# Patient Record
Sex: FEMALE | Race: WHITE | NOT HISPANIC OR LATINO | Employment: OTHER | URBAN - METROPOLITAN AREA
[De-identification: names, ages, dates, MRNs, and addresses within clinical notes are randomized per-mention and may not be internally consistent; named-entity substitution may affect disease eponyms.]

---

## 2021-01-14 ENCOUNTER — TELEPHONE (OUTPATIENT)
Dept: OBSTETRICS AND GYNECOLOGY | Facility: CLINIC | Age: 51
End: 2021-01-14

## 2021-02-03 ENCOUNTER — OFFICE VISIT (OUTPATIENT)
Dept: OBSTETRICS AND GYNECOLOGY | Facility: CLINIC | Age: 51
End: 2021-02-03
Payer: COMMERCIAL

## 2021-02-03 VITALS
HEIGHT: 61 IN | SYSTOLIC BLOOD PRESSURE: 115 MMHG | BODY MASS INDEX: 34.55 KG/M2 | DIASTOLIC BLOOD PRESSURE: 75 MMHG | WEIGHT: 183 LBS | HEART RATE: 80 BPM

## 2021-02-03 DIAGNOSIS — Z12.31 BREAST CANCER SCREENING BY MAMMOGRAM: ICD-10-CM

## 2021-02-03 DIAGNOSIS — Z01.419 WELL WOMAN EXAM WITH ROUTINE GYNECOLOGICAL EXAM: Primary | ICD-10-CM

## 2021-02-03 PROCEDURE — 3008F PR BODY MASS INDEX (BMI) DOCUMENTED: ICD-10-PCS | Mod: CPTII,S$GLB,, | Performed by: OBSTETRICS & GYNECOLOGY

## 2021-02-03 PROCEDURE — 99396 PR PREVENTIVE VISIT,EST,40-64: ICD-10-PCS | Mod: 25,S$GLB,, | Performed by: OBSTETRICS & GYNECOLOGY

## 2021-02-03 PROCEDURE — 99396 PREV VISIT EST AGE 40-64: CPT | Mod: 25,S$GLB,, | Performed by: OBSTETRICS & GYNECOLOGY

## 2021-02-03 PROCEDURE — 3008F BODY MASS INDEX DOCD: CPT | Mod: CPTII,S$GLB,, | Performed by: OBSTETRICS & GYNECOLOGY

## 2021-02-03 PROCEDURE — 82274 ASSAY TEST FOR BLOOD FECAL: CPT | Mod: QW,S$GLB,, | Performed by: OBSTETRICS & GYNECOLOGY

## 2021-02-03 PROCEDURE — 82274 PR  BLOOD,OCCULT,FECAL HGB,FECES,1-3 SIMULT: ICD-10-PCS | Mod: QW,S$GLB,, | Performed by: OBSTETRICS & GYNECOLOGY

## 2021-02-03 RX ORDER — FLUOXETINE HYDROCHLORIDE 20 MG/1
20 CAPSULE ORAL DAILY
Qty: 90 CAPSULE | Refills: 3 | Status: SHIPPED | OUTPATIENT
Start: 2021-02-03 | End: 2021-04-01 | Stop reason: SDUPTHER

## 2021-02-03 RX ORDER — ESTRADIOL 0.5 MG/.5G
0.05 GEL TOPICAL DAILY
Qty: 90 PACKET | Refills: 3 | Status: SHIPPED | OUTPATIENT
Start: 2021-02-03 | End: 2022-02-07

## 2021-02-03 RX ORDER — ALPRAZOLAM 0.5 MG/1
0.5 TABLET ORAL 2 TIMES DAILY PRN
Qty: 28 TABLET | Refills: 3 | Status: SHIPPED | OUTPATIENT
Start: 2021-02-03 | End: 2023-02-13 | Stop reason: SDUPTHER

## 2021-02-03 RX ORDER — MOMETASONE FUROATE AND FORMOTEROL FUMARATE DIHYDRATE 100; 5 UG/1; UG/1
AEROSOL RESPIRATORY (INHALATION)
COMMUNITY
Start: 2021-02-01

## 2021-02-03 RX ORDER — TRIAMCINOLONE ACETONIDE 0.25 MG/G
CREAM TOPICAL
COMMUNITY
Start: 2021-01-15 | End: 2022-02-07

## 2021-02-03 RX ORDER — ALPRAZOLAM 0.5 MG/1
0.5 TABLET ORAL
COMMUNITY
End: 2021-02-03 | Stop reason: SDUPTHER

## 2021-02-03 RX ORDER — ALBUTEROL SULFATE 90 UG/1
AEROSOL, METERED RESPIRATORY (INHALATION)
COMMUNITY
Start: 2021-01-27

## 2021-02-03 RX ORDER — BUPROPION HYDROCHLORIDE 150 MG/1
150 TABLET ORAL EVERY MORNING
Qty: 90 TABLET | Refills: 2 | Status: SHIPPED | OUTPATIENT
Start: 2021-02-03 | End: 2022-02-07

## 2021-02-03 RX ORDER — FLUOXETINE HYDROCHLORIDE 20 MG/1
20 CAPSULE ORAL DAILY
COMMUNITY
Start: 2021-01-19 | End: 2021-02-03 | Stop reason: SDUPTHER

## 2021-02-03 RX ORDER — ESTRADIOL 0.5 MG/.5G
GEL TOPICAL
COMMUNITY
Start: 2021-01-06 | End: 2021-02-03 | Stop reason: SDUPTHER

## 2021-02-09 ENCOUNTER — PATIENT MESSAGE (OUTPATIENT)
Dept: OBSTETRICS AND GYNECOLOGY | Facility: CLINIC | Age: 51
End: 2021-02-09

## 2021-02-10 ENCOUNTER — PATIENT MESSAGE (OUTPATIENT)
Dept: OBSTETRICS AND GYNECOLOGY | Facility: CLINIC | Age: 51
End: 2021-02-10

## 2021-02-10 DIAGNOSIS — F41.9 ANXIETY: ICD-10-CM

## 2021-02-10 DIAGNOSIS — Z79.890 HORMONE REPLACEMENT THERAPY (HRT): Primary | ICD-10-CM

## 2021-02-10 RX ORDER — ESTRADIOL 1 MG/1
1 TABLET ORAL DAILY
Qty: 90 TABLET | Refills: 3 | Status: SHIPPED | OUTPATIENT
Start: 2021-02-10 | End: 2022-02-07

## 2021-02-10 RX ORDER — FLUOXETINE HYDROCHLORIDE 40 MG/1
40 CAPSULE ORAL DAILY
Qty: 90 CAPSULE | Refills: 3 | Status: SHIPPED | OUTPATIENT
Start: 2021-02-10 | End: 2022-02-07

## 2021-03-17 ENCOUNTER — TELEPHONE (OUTPATIENT)
Dept: OBSTETRICS AND GYNECOLOGY | Facility: CLINIC | Age: 51
End: 2021-03-17

## 2021-03-18 ENCOUNTER — PATIENT MESSAGE (OUTPATIENT)
Dept: OBSTETRICS AND GYNECOLOGY | Facility: CLINIC | Age: 51
End: 2021-03-18

## 2021-03-18 ENCOUNTER — TELEPHONE (OUTPATIENT)
Dept: OBSTETRICS AND GYNECOLOGY | Facility: CLINIC | Age: 51
End: 2021-03-18

## 2021-03-30 ENCOUNTER — PATIENT MESSAGE (OUTPATIENT)
Dept: OBSTETRICS AND GYNECOLOGY | Facility: CLINIC | Age: 51
End: 2021-03-30

## 2021-03-31 ENCOUNTER — PATIENT MESSAGE (OUTPATIENT)
Dept: OBSTETRICS AND GYNECOLOGY | Facility: CLINIC | Age: 51
End: 2021-03-31

## 2021-04-01 DIAGNOSIS — F41.9 ANXIETY: Primary | ICD-10-CM

## 2021-04-01 DIAGNOSIS — R51.9 FREQUENT HEADACHES: ICD-10-CM

## 2021-04-01 RX ORDER — BUTALBITAL, ACETAMINOPHEN AND CAFFEINE 50; 325; 40 MG/1; MG/1; MG/1
1 TABLET ORAL EVERY 6 HOURS PRN
Qty: 18 TABLET | Refills: 1 | Status: SHIPPED | OUTPATIENT
Start: 2021-04-01 | End: 2021-04-19

## 2021-04-01 RX ORDER — FLUOXETINE HYDROCHLORIDE 20 MG/1
20 CAPSULE ORAL DAILY
Qty: 90 CAPSULE | Refills: 3 | Status: SHIPPED | OUTPATIENT
Start: 2021-04-01 | End: 2022-02-07

## 2021-06-02 ENCOUNTER — TELEPHONE (OUTPATIENT)
Dept: OBSTETRICS AND GYNECOLOGY | Facility: CLINIC | Age: 51
End: 2021-06-02

## 2021-06-03 ENCOUNTER — PATIENT MESSAGE (OUTPATIENT)
Dept: OBSTETRICS AND GYNECOLOGY | Facility: CLINIC | Age: 51
End: 2021-06-03

## 2021-06-04 RX ORDER — FLUOXETINE 10 MG/1
10 CAPSULE ORAL DAILY
Qty: 30 CAPSULE | Refills: 2 | Status: SHIPPED | OUTPATIENT
Start: 2021-06-04 | End: 2021-09-03 | Stop reason: SDUPTHER

## 2021-09-03 DIAGNOSIS — F41.9 ANXIETY: Primary | ICD-10-CM

## 2021-09-03 RX ORDER — FLUOXETINE 10 MG/1
10 CAPSULE ORAL DAILY
Qty: 30 CAPSULE | Refills: 2 | Status: SHIPPED | OUTPATIENT
Start: 2021-09-03 | End: 2021-11-30 | Stop reason: SDUPTHER

## 2021-11-30 DIAGNOSIS — F41.9 ANXIETY: ICD-10-CM

## 2021-11-30 RX ORDER — ALPRAZOLAM 0.5 MG/1
0.5 TABLET ORAL NIGHTLY PRN
Qty: 28 TABLET | Refills: 0 | Status: SHIPPED | OUTPATIENT
Start: 2021-11-30 | End: 2022-02-07

## 2021-11-30 RX ORDER — FLUOXETINE 10 MG/1
10 CAPSULE ORAL DAILY
Qty: 30 CAPSULE | Refills: 2 | Status: SHIPPED | OUTPATIENT
Start: 2021-11-30 | End: 2022-02-07 | Stop reason: SDUPTHER

## 2022-02-07 ENCOUNTER — OFFICE VISIT (OUTPATIENT)
Dept: OBSTETRICS AND GYNECOLOGY | Facility: CLINIC | Age: 52
End: 2022-02-07
Payer: COMMERCIAL

## 2022-02-07 VITALS
SYSTOLIC BLOOD PRESSURE: 109 MMHG | WEIGHT: 177 LBS | DIASTOLIC BLOOD PRESSURE: 76 MMHG | BODY MASS INDEX: 33.44 KG/M2 | HEART RATE: 68 BPM

## 2022-02-07 DIAGNOSIS — Z01.419 WELL WOMAN EXAM WITH ROUTINE GYNECOLOGICAL EXAM: Primary | ICD-10-CM

## 2022-02-07 DIAGNOSIS — Z12.31 BREAST CANCER SCREENING BY MAMMOGRAM: ICD-10-CM

## 2022-02-07 DIAGNOSIS — F41.9 ANXIETY: ICD-10-CM

## 2022-02-07 DIAGNOSIS — Z12.11 COLON CANCER SCREENING: ICD-10-CM

## 2022-02-07 PROCEDURE — 82274 ASSAY TEST FOR BLOOD FECAL: CPT | Mod: QW,S$GLB,, | Performed by: OBSTETRICS & GYNECOLOGY

## 2022-02-07 PROCEDURE — 3008F BODY MASS INDEX DOCD: CPT | Mod: CPTII,S$GLB,, | Performed by: OBSTETRICS & GYNECOLOGY

## 2022-02-07 PROCEDURE — 99396 PREV VISIT EST AGE 40-64: CPT | Mod: 25,S$GLB,, | Performed by: OBSTETRICS & GYNECOLOGY

## 2022-02-07 PROCEDURE — 3074F PR MOST RECENT SYSTOLIC BLOOD PRESSURE < 130 MM HG: ICD-10-PCS | Mod: CPTII,S$GLB,, | Performed by: OBSTETRICS & GYNECOLOGY

## 2022-02-07 PROCEDURE — 3074F SYST BP LT 130 MM HG: CPT | Mod: CPTII,S$GLB,, | Performed by: OBSTETRICS & GYNECOLOGY

## 2022-02-07 PROCEDURE — 3008F PR BODY MASS INDEX (BMI) DOCUMENTED: ICD-10-PCS | Mod: CPTII,S$GLB,, | Performed by: OBSTETRICS & GYNECOLOGY

## 2022-02-07 PROCEDURE — 1159F PR MEDICATION LIST DOCUMENTED IN MEDICAL RECORD: ICD-10-PCS | Mod: CPTII,S$GLB,, | Performed by: OBSTETRICS & GYNECOLOGY

## 2022-02-07 PROCEDURE — 99396 PR PREVENTIVE VISIT,EST,40-64: ICD-10-PCS | Mod: 25,S$GLB,, | Performed by: OBSTETRICS & GYNECOLOGY

## 2022-02-07 PROCEDURE — 3078F DIAST BP <80 MM HG: CPT | Mod: CPTII,S$GLB,, | Performed by: OBSTETRICS & GYNECOLOGY

## 2022-02-07 PROCEDURE — 3078F PR MOST RECENT DIASTOLIC BLOOD PRESSURE < 80 MM HG: ICD-10-PCS | Mod: CPTII,S$GLB,, | Performed by: OBSTETRICS & GYNECOLOGY

## 2022-02-07 PROCEDURE — 82274 PR  BLOOD,OCCULT,FECAL HGB,FECES,1-3 SIMULT: ICD-10-PCS | Mod: QW,S$GLB,, | Performed by: OBSTETRICS & GYNECOLOGY

## 2022-02-07 PROCEDURE — 1159F MED LIST DOCD IN RCRD: CPT | Mod: CPTII,S$GLB,, | Performed by: OBSTETRICS & GYNECOLOGY

## 2022-02-07 RX ORDER — FLUOXETINE 10 MG/1
10 CAPSULE ORAL DAILY
Qty: 90 CAPSULE | Refills: 4 | Status: SHIPPED | OUTPATIENT
Start: 2022-02-07 | End: 2023-02-07

## 2022-02-07 NOTE — PROGRESS NOTES
Subjective:       Patient ID: Stacia Womack is a 51 y.o. female.    Chief Complaint:  Well Woman      History of Present Illness  She is doing well.  No new health issues,  No abnormal bleeding, No GI or Gu concerns,  No dyspareunia.   SHE IS DOING WELL WITH THE PROZAC.  THE 10MG IS WORKING FOR HER.      HPI      GYN & OB History  No LMP recorded. Patient has had a hysterectomy.     Date of Last Pap: No result found    OB History   No obstetric history on file.       Review of Systems  Review of Systems   Constitutional: Negative for activity change.   Eyes: Negative for visual disturbance.   Respiratory: Negative for shortness of breath.    Cardiovascular: Negative for chest pain.   Gastrointestinal: Negative for abdominal pain.   Genitourinary: Negative for vaginal bleeding.        No abnormal vaginal bleeding   Musculoskeletal: Negative for back pain.   Integumentary:  Negative for rash and breast mass.   Neurological: Negative for numbness.   Psychiatric/Behavioral:        No mood disturbance or changes    Breast: Negative for mass            Objective:    Physical Exam:   Constitutional: She is oriented to person, place, and time. She appears well-developed.    HENT:   Head: Normocephalic.     Neck: Trachea normal. No thyromegaly present.    Cardiovascular: Normal rate, regular rhythm and normal heart sounds.     Pulmonary/Chest: Effort normal and breath sounds normal. Right breast exhibits no mass, no nipple discharge and no skin change. Left breast exhibits no mass, no nipple discharge and no skin change.        Abdominal: Soft. There is no abdominal tenderness. There is no rigidity and no guarding.     Genitourinary:    Vagina and rectum normal.      Pelvic exam was performed with patient supine.   There is no lesion on the right labia. There is no lesion on the left labia. Right adnexum displays no mass and no tenderness. Left adnexum displays no mass and no tenderness. No  no vaginal discharge in the  vagina. Cervix is absent.Uterus is absent.              Lymphadenopathy:        Head (right side): No submental and no submandibular adenopathy present.        Head (left side): No submental and no submandibular adenopathy present.     She has no cervical adenopathy.    Neurological: She is alert and oriented to person, place, and time.    Skin: Skin is warm.    Psychiatric: She has a normal mood and affect. Her speech is normal and behavior is normal. Thought content normal.          Assessment:        1. Well woman exam with routine gynecological exam    2. Breast cancer screening by mammogram    3. Colon cancer screening               Plan:           PROZAC  Pap   Mammogram  Follow up one year or sooner if needed  Self breast exams  Consider health profile if labs not done with PCP  Recommend colonoscopy as indicated  Bone density discussed   Pt is aware we call all results. If she does not hear from our office regarding her result within a week of having a study or procedure performed she is to call the office so that we can research the result for her as I do not know when she is scheduling her procedures.   Chaperone was present

## 2023-02-13 ENCOUNTER — OFFICE VISIT (OUTPATIENT)
Dept: OBSTETRICS AND GYNECOLOGY | Facility: CLINIC | Age: 53
End: 2023-02-13
Payer: COMMERCIAL

## 2023-02-13 DIAGNOSIS — Z01.419 WELL WOMAN EXAM WITH ROUTINE GYNECOLOGICAL EXAM: Primary | ICD-10-CM

## 2023-02-13 DIAGNOSIS — Z12.31 BREAST CANCER SCREENING BY MAMMOGRAM: ICD-10-CM

## 2023-02-13 PROCEDURE — 99396 PREV VISIT EST AGE 40-64: CPT | Mod: S$GLB,,, | Performed by: OBSTETRICS & GYNECOLOGY

## 2023-02-13 PROCEDURE — 99396 PR PREVENTIVE VISIT,EST,40-64: ICD-10-PCS | Mod: S$GLB,,, | Performed by: OBSTETRICS & GYNECOLOGY

## 2023-02-13 RX ORDER — ALPRAZOLAM 0.5 MG/1
0.5 TABLET ORAL 2 TIMES DAILY PRN
Qty: 28 TABLET | Refills: 0 | Status: SHIPPED | OUTPATIENT
Start: 2023-02-13 | End: 2024-02-26 | Stop reason: SDUPTHER

## 2023-02-13 NOTE — PROGRESS NOTES
Subjective:       Patient ID: Stacia Womack is a 52 y.o. female.    Chief Complaint:  No chief complaint on file.      History of Present Illness  She is doing well.  No new health issues,  No abnormal bleeding, No GI or Gu concerns,  No dyspareunia.   She is seeing winnie gonzalez for semigutide.    She is using a cream testosterone and progesterone.  No estrogen.   She is also on a thyroid supplement as well now   She was on the progesterone and had hot flash improvement but she had breast tenderness however that improved when she added testosterone  HPI      GYN & OB History  No LMP recorded. Patient has had a hysterectomy.     Date of Last Pap: No result found    OB History   No obstetric history on file.       Review of Systems  Review of Systems   Constitutional:  Negative for activity change.   Eyes:  Negative for visual disturbance.   Respiratory:  Negative for shortness of breath.    Cardiovascular:  Negative for chest pain.   Gastrointestinal:  Negative for abdominal pain.   Genitourinary:  Negative for vaginal bleeding.        No abnormal vaginal bleeding   Musculoskeletal:  Negative for back pain.   Integumentary:  Negative for rash and breast mass.   Neurological:  Negative for numbness.   Psychiatric/Behavioral:          No mood disturbance or changes    Breast: Negative for mass          Objective:    Physical Exam:   Constitutional: She is oriented to person, place, and time. She appears well-developed.    HENT:   Head: Normocephalic.     Neck: Trachea normal. No thyromegaly present.    Cardiovascular:  Normal rate, regular rhythm and normal heart sounds.             Pulmonary/Chest: Effort normal and breath sounds normal. Right breast exhibits no mass, no nipple discharge and no skin change. Left breast exhibits no mass, no nipple discharge and no skin change.   Mild fibrocystic changes    During the exam self breast exam discussed         Abdominal: Soft. There is no abdominal tenderness. There is  no rigidity and no guarding.     Genitourinary:    Vagina normal.      Pelvic exam was performed with patient supine.   Labial bartholins normal.There is no lesion on the right labia. There is no lesion on the left labia. Right adnexum displays no mass and no tenderness. Left adnexum displays no mass and no tenderness. Cervix is absent.Uterus is absent.              Lymphadenopathy:        Head (right side): No submental and no submandibular adenopathy present.        Head (left side): No submental and no submandibular adenopathy present.     She has no cervical adenopathy.    Neurological: She is alert and oriented to person, place, and time.    Skin: Skin is warm.    Psychiatric: She has a normal mood and affect. Her speech is normal and behavior is normal. Thought content normal.        Assessment:        1. Well woman exam with routine gynecological exam    2. Breast cancer screening by mammogram               Plan:             Pap   Mammogram  Follow up one year or sooner if needed  Self breast exams  Consider health profile if labs not done with PCP  Recommend colonoscopy as indicated--done in october  Bone density discussed   Pt is aware we call all results. If she does not hear from our office regarding her result within a week of having a study or procedure performed she is to call the office so that we can research the result for her as I do not know when she is scheduling her procedures.   Chaperone was present

## 2023-02-14 LAB — Lab: NORMAL

## 2024-02-26 ENCOUNTER — OFFICE VISIT (OUTPATIENT)
Dept: OBSTETRICS AND GYNECOLOGY | Facility: CLINIC | Age: 54
End: 2024-02-26
Payer: COMMERCIAL

## 2024-02-26 VITALS
BODY MASS INDEX: 27.19 KG/M2 | DIASTOLIC BLOOD PRESSURE: 68 MMHG | SYSTOLIC BLOOD PRESSURE: 106 MMHG | HEART RATE: 80 BPM | WEIGHT: 144 LBS | HEIGHT: 61 IN

## 2024-02-26 DIAGNOSIS — F41.9 ANXIETY: ICD-10-CM

## 2024-02-26 DIAGNOSIS — Z12.31 BREAST CANCER SCREENING BY MAMMOGRAM: ICD-10-CM

## 2024-02-26 DIAGNOSIS — F41.9 ANXIETY: Primary | ICD-10-CM

## 2024-02-26 DIAGNOSIS — Z01.419 WELL WOMAN EXAM WITH ROUTINE GYNECOLOGICAL EXAM: Primary | ICD-10-CM

## 2024-02-26 PROCEDURE — 3078F DIAST BP <80 MM HG: CPT | Mod: CPTII,S$GLB,, | Performed by: OBSTETRICS & GYNECOLOGY

## 2024-02-26 PROCEDURE — 99396 PREV VISIT EST AGE 40-64: CPT | Mod: S$GLB,,, | Performed by: OBSTETRICS & GYNECOLOGY

## 2024-02-26 PROCEDURE — 3074F SYST BP LT 130 MM HG: CPT | Mod: CPTII,S$GLB,, | Performed by: OBSTETRICS & GYNECOLOGY

## 2024-02-26 PROCEDURE — 99459 PELVIC EXAMINATION: CPT | Mod: S$GLB,,, | Performed by: OBSTETRICS & GYNECOLOGY

## 2024-02-26 PROCEDURE — 3008F BODY MASS INDEX DOCD: CPT | Mod: CPTII,S$GLB,, | Performed by: OBSTETRICS & GYNECOLOGY

## 2024-02-26 RX ORDER — LEVOTHYROXINE, LIOTHYRONINE 38; 9 UG/1; UG/1
60 TABLET ORAL
COMMUNITY

## 2024-02-26 RX ORDER — ESTRADIOL 0.5 MG/1
0.5 TABLET ORAL NIGHTLY
COMMUNITY
Start: 2024-02-06

## 2024-02-26 RX ORDER — GINKGO BILOBA LEAF EXTRACT 60 MG
1 CAPSULE ORAL DAILY
COMMUNITY

## 2024-02-26 RX ORDER — PROGESTERONE 200 MG/1
200 CAPSULE ORAL NIGHTLY
COMMUNITY
Start: 2024-02-02

## 2024-02-26 RX ORDER — ALPRAZOLAM 0.5 MG/1
0.5 TABLET ORAL 3 TIMES DAILY PRN
Qty: 30 TABLET | Refills: 0 | Status: CANCELLED | OUTPATIENT
Start: 2024-02-26 | End: 2025-02-25

## 2024-02-26 RX ORDER — ALPRAZOLAM 0.5 MG/1
0.5 TABLET ORAL 2 TIMES DAILY PRN
Qty: 28 TABLET | Refills: 0 | Status: SHIPPED | OUTPATIENT
Start: 2024-02-26

## 2024-02-26 RX ORDER — PROGESTERONE 100 MG/1
CAPSULE ORAL
COMMUNITY

## 2024-02-26 RX ORDER — CYCLOBENZAPRINE HCL 10 MG
TABLET ORAL
COMMUNITY
Start: 2024-01-22

## 2024-02-26 NOTE — PROGRESS NOTES
Subjective:       Patient ID: Stacia Womack is a 53 y.o. female.    Chief Complaint:  Well Woman      History of Present Illness  She is doing well.  No new health issues,  No abnormal bleeding, No GI or Gu concerns,  No dyspareunia.   She is on HRT and seeing Penelope Price.    She is on mounjaro shot.     HPI      GYN & OB History  No LMP recorded. Patient has had a hysterectomy.     Date of Last Pap: No result found    OB History   No obstetric history on file.       Review of Systems  Review of Systems   Constitutional:  Negative for activity change.   Eyes:  Negative for visual disturbance.   Respiratory:  Negative for shortness of breath.    Cardiovascular:  Negative for chest pain.   Gastrointestinal:  Negative for abdominal pain.   Genitourinary:  Negative for vaginal bleeding.        No abnormal vaginal bleeding   Musculoskeletal:  Negative for back pain.   Integumentary:  Negative for rash and breast mass.   Neurological:  Negative for numbness.   Psychiatric/Behavioral:          No mood disturbance or changes    Breast: Negative for mass          Objective:    Physical Exam:   Constitutional: She is oriented to person, place, and time. She appears well-developed.    HENT:   Head: Normocephalic.     Neck: Trachea normal. No thyromegaly present.    Cardiovascular:  Normal rate, regular rhythm and normal heart sounds.             Pulmonary/Chest: Effort normal and breath sounds normal. Right breast exhibits no mass, no nipple discharge and no skin change. Left breast exhibits no mass, no nipple discharge and no skin change.   Mild fibrocystic changes    During the exam self breast exam discussed         Abdominal: Soft. There is no abdominal tenderness. There is no rigidity and no guarding.     Genitourinary:    Vagina normal.      Pelvic exam was performed with patient supine.     Labial bartholins normal.There is no lesion on the right labia. There is no lesion on the left labia. Right adnexum displays  no mass and no tenderness. Left adnexum displays no mass and no tenderness. Cervix is absent.Uterus is absent.              Lymphadenopathy:        Head (right side): No submental and no submandibular adenopathy present.        Head (left side): No submental and no submandibular adenopathy present.     She has no cervical adenopathy.    Neurological: She is alert and oriented to person, place, and time.    Skin: Skin is warm.    Psychiatric: She has a normal mood and affect. Her speech is normal and behavior is normal. Thought content normal.        Assessment:      No diagnosis found.           Plan:             Pap   Mammogram  Follow up one year or sooner if needed  Self breast exams  Consider health profile if labs not done with PCP  Recommend colonoscopy as indicated  Bone density discussed   Pt is aware we call all results. If she does not hear from our office regarding her result within a week of having a study or procedure performed she is to call the office so that we can research the result for her as I do not know when she is scheduling her procedures.   Chaperone was present

## 2024-12-04 ENCOUNTER — TELEPHONE (OUTPATIENT)
Dept: OBSTETRICS AND GYNECOLOGY | Facility: CLINIC | Age: 54
End: 2024-12-04
Payer: COMMERCIAL

## 2024-12-04 DIAGNOSIS — M54.2 NECK PAIN: Primary | ICD-10-CM

## 2024-12-04 NOTE — TELEPHONE ENCOUNTER
Patient is having neck pain, she is currently seeing someone but was told she may need a second opinion. She is requesting a referral to Dr Aurora Landrum.  Will send referral and advised patient to call and let us know if she does not hear from them within a week.   Patient verbalized understanding.

## 2024-12-04 NOTE — TELEPHONE ENCOUNTER
----- Message from Amelia sent at 12/4/2024  2:43 PM CST -----  Contact: self [phone]  Type:  Patient Requesting Referral    Who Called:Stacia   Referral to What Specialty:pain med   Reason for Referral:neck   Does the patient want the referral with a specific physician?:Dr Landrum   Is the specialist an [facility]?:  Patient Requesting a Response?:yes   Would the patient rather a call back or a response via [facility]? Call back   Best Call Back Number:[phone]  Additional Information:   [Diarrhea] : diarrhea [Negative] : Genitourinary

## 2024-12-15 ENCOUNTER — TELEPHONE (OUTPATIENT)
Dept: PAIN MEDICINE | Facility: CLINIC | Age: 54
End: 2024-12-15
Payer: COMMERCIAL

## 2024-12-15 NOTE — TELEPHONE ENCOUNTER
Patient scheduled.     Imaging: There is a cervical MRI at [facility], please obtain images and report.

## 2025-01-08 ENCOUNTER — OFFICE VISIT (OUTPATIENT)
Dept: PAIN MEDICINE | Facility: CLINIC | Age: 55
End: 2025-01-08
Payer: COMMERCIAL

## 2025-01-08 VITALS
DIASTOLIC BLOOD PRESSURE: 76 MMHG | SYSTOLIC BLOOD PRESSURE: 114 MMHG | HEIGHT: 61 IN | BODY MASS INDEX: 27.21 KG/M2 | OXYGEN SATURATION: 98 % | HEART RATE: 88 BPM

## 2025-01-08 DIAGNOSIS — M54.9 DORSALGIA: Primary | ICD-10-CM

## 2025-01-08 DIAGNOSIS — M50.30 DDD (DEGENERATIVE DISC DISEASE), CERVICAL: ICD-10-CM

## 2025-01-08 DIAGNOSIS — M54.2 NECK PAIN: ICD-10-CM

## 2025-01-08 DIAGNOSIS — M47.812 CERVICAL SPONDYLOSIS: ICD-10-CM

## 2025-01-08 DIAGNOSIS — M54.12 CERVICAL RADICULOPATHY: ICD-10-CM

## 2025-01-08 DIAGNOSIS — M75.21 BICEPS TENDINITIS OF RIGHT SHOULDER: ICD-10-CM

## 2025-01-08 PROBLEM — G43.909 MIGRAINE: Status: ACTIVE | Noted: 2025-01-08

## 2025-01-08 PROBLEM — K76.0 FATTY LIVER: Status: ACTIVE | Noted: 2025-01-08

## 2025-01-08 PROBLEM — K63.5 POLYP OF COLON: Status: ACTIVE | Noted: 2025-01-08

## 2025-01-08 PROBLEM — F41.9 ANXIETY: Status: ACTIVE | Noted: 2025-01-08

## 2025-01-08 PROBLEM — J45.909 ASTHMA: Status: ACTIVE | Noted: 2025-01-08

## 2025-01-08 PROCEDURE — 1159F MED LIST DOCD IN RCRD: CPT | Mod: CPTII,,, | Performed by: PHYSICAL MEDICINE & REHABILITATION

## 2025-01-08 PROCEDURE — 3078F DIAST BP <80 MM HG: CPT | Mod: CPTII,,, | Performed by: PHYSICAL MEDICINE & REHABILITATION

## 2025-01-08 PROCEDURE — 1160F RVW MEDS BY RX/DR IN RCRD: CPT | Mod: CPTII,,, | Performed by: PHYSICAL MEDICINE & REHABILITATION

## 2025-01-08 PROCEDURE — 3074F SYST BP LT 130 MM HG: CPT | Mod: CPTII,,, | Performed by: PHYSICAL MEDICINE & REHABILITATION

## 2025-01-08 PROCEDURE — 3008F BODY MASS INDEX DOCD: CPT | Mod: CPTII,,, | Performed by: PHYSICAL MEDICINE & REHABILITATION

## 2025-01-08 PROCEDURE — 99205 OFFICE O/P NEW HI 60 MIN: CPT | Mod: S$PBB,,, | Performed by: PHYSICAL MEDICINE & REHABILITATION

## 2025-01-08 RX ORDER — LEVOTHYROXINE, LIOTHYRONINE 19; 4.5 UG/1; UG/1
30 TABLET ORAL
COMMUNITY
Start: 2024-12-06

## 2025-01-08 RX ORDER — ESTRADIOL 2 MG/1
2 TABLET ORAL 2 TIMES DAILY
COMMUNITY
Start: 2024-09-30

## 2025-01-08 RX ORDER — GABAPENTIN 300 MG/1
300 CAPSULE ORAL NIGHTLY
COMMUNITY
Start: 2024-12-24

## 2025-01-08 RX ORDER — TIZANIDINE 4 MG/1
4 TABLET ORAL 3 TIMES DAILY
COMMUNITY
Start: 2024-07-26

## 2025-01-08 RX ORDER — METHOCARBAMOL 750 MG/1
750 TABLET, FILM COATED ORAL 3 TIMES DAILY
COMMUNITY
Start: 2024-10-23

## 2025-01-08 NOTE — PROGRESS NOTES
[facility]      Referring Provider: [place]cker [place]  [address], [address]  [place] [address]    Chief Complaint:   Chief Complaint   Patient presents with    Neck Pain       History of Present Illness: Stacia Womack is a 54 y.o. female referred by Dr. Micha Decker III for neck pain.      Neck Pain:  Onset: more than 20 years    Onset was Gradual  Accident or Work Related: No .  Since Onset, Pain has been worsening  Location: bilateral neck, upper paraspinal region  Radiation: right upper arm into biceps  Quality: Aching, Throbbing, Tight, Tingling, Stabbing, Sharp, Shooting, Burning, Hot, Tense, and Deep  Timing: Constant  Exacerbating Factors: Standing, Walking, Looking down, Looking up, Twisting/Turning head, Driving, Coughing, Laying down, Morning time, Sleeping, Sneezing, Lifting, Reaching, and Arm/Shoulder movements  Alleviating Factors: Rest and Medications: gabapentin  Associated Symptoms: (+) weakness in arm/hand(s) right, (+) numbness in arm/hand(s) right shoulder area, Denies weakness in leg(s) bilateral, (+) numbness in leg(s) bilaterally, Denies changes in handwriting, Denies difficulty with buttons, (+) changes in gait, (+) pain waking at night, (+) night sweats, Denies fevers, (+) headaches, Denies history of cancer, Denies unexplained weight loss, Denies changes in bowel function, Denies changes in bladder function.    Severity: Current: 8/10   Worst Pain: 10/10     Least Pain: 8/10     Average Pain: 8/10      The Neck Disability Index Questionnaire    1. Pain Intensity: 4 - The pain is severe but comes and goes.   2. Personal Care: 4 - I need help every day in most aspects of self-care.  3. Liftin - I cannot lift or carry anything at all.   4. Readin - I cannot read as much as I want becuase of severe pain in my neck.  5. Headache: 4 - I have severe headaches which come frequently.  6. Concentration 3 - I have a lot of difficulty in concentrating  when I want to.  7. Work 3 - I cannot do my usual work.   8. Driving 3 - I cannot drive my care as long as I want because of moderate pain in my neck.   9. Sleeping 5 - I sleep is completely disturbed (5-7 hours sleepless).  10. Recreation 4 - I can hardly do any recreational activities because of pain in my neck.    Total: 35/50  If all 10 sections are completed, simply double to patient's score.  If a section is omitted, divide the patient's total score by (5 x the number of sections completed).  Neck Disability Index (NDI) Score: 70%    Prior Work-Up: MRI      Previous Therapies/Treatments:  Activity Modification (rest, avoiding aggravating factors, etc.): Yes - Helpful  Heat (heating pads, etc.): Yes - Not Helpful  Cold (ice packs): Yes - Not Helpful  Physician guided home exercise program: Yes - Not Helpful  PT/OT: Yes - Helpful  Chiropractor: Yes - Not Helpful  Acupuncture: Not tried  Massage: Yes - Not Helpful  Bracing: Not tried  TENS unit: Not tried    Previous Medications:   - NSAIDS: Acetaminophen (Tylenol) Tried - Not Helpful, Ibuprofen (Advil) Tried - Not Helpful, Meloxicam (Mobic) Tried - Not Helpful, and Diclofenac (Voltaren, Cataflam) Tried - Not Helpful  - Muscle Relaxants: Cyclobenzaprine (Flexeril) Tried - Not Helpful, Methocarbamol (Robaxin) Tried - Not Helpful, and Tizanidine (Zanaflex) Tried - Not Helpful  - TCAs: Not tried  - SNRIs/Antidepressants for Pain: Duloxetine (Cymbalta) Tried - Adverse Effect (didn't tolerate)  - Topicals:   - Anticonvulsants: Gabapentin (Neurontin) Tried - Helpful  - Opioids: Hydrocodone/Acetaminophen (e.g., Lorcet, Norco, Lortab) and Oxycodone/Acetaminophen (e.g., Percocet, Endocet)    Relevant Surgery: no     Previous Interventions for Pain:  - FROILAN cervical Yes - Not Helpful  MBB cervical Yes - Helpful  RFA cervical Yes - Worsened pain  TPI cervical Yes - Helpful  Nerve block occipital nerve blocks Yes - Helpful  RFA with Dr. Scanlon most previously worsened right  shoulder pain.     Patient has failed > 6 weeks of conservative treatment including: Activity modification (avoiding exacerbating factors), physical therapy, physician-led home exercise program, and oral medications.     Opioid Risk Score       None             Current Pain Medications:  Tizanidine 4 mg  Gabapentin 300 mg   Ibuprofen OTC     Blood Thinners: none    Allergies:  Hydrocodone and Tramadol     Full Medication List:    Current Outpatient Medications:     albuterol (PROVENTIL/VENTOLIN HFA) 90 mcg/actuation inhaler, INHALE 2 PUFFS BY MOUTH FOUR TIMES DAILY AS NEEDED FOR SHORTNESS OF BREATH, Disp: , Rfl:     ALPRAZolam (XANAX) 0.5 MG tablet, Take 1 tablet (0.5 mg total) by mouth 2 (two) times daily as needed for Anxiety., Disp: 28 tablet, Rfl: 0    DULERA 100-5 mcg/actuation HFAA, , Disp: , Rfl:     estradioL (ESTRACE) 2 MG tablet, Take 2 mg by mouth 2 (two) times daily., Disp: , Rfl:     evening primrose oil 500 mg Cap, Take 1 capsule by mouth once daily., Disp: , Rfl:     gabapentin (NEURONTIN) 300 MG capsule, Take 300 mg by mouth every evening., Disp: , Rfl:     ibuprofen-diphenhydrAMINE cit 200-38 mg Tab, Take 1 tablet by mouth as needed., Disp: , Rfl:     methocarbamoL (ROBAXIN) 750 MG Tab, Take 750 mg by mouth 3 (three) times daily., Disp: , Rfl:     NP THYROID 30 mg Tab, 30 mg., Disp: , Rfl:     progesterone (PROMETRIUM) 100 MG capsule, TAKE 1 CAPSULE BY MOUTH EVERY NIGHT AT BEDTIME IN ADDITION  MG PROGESTERONE, Disp: , Rfl:     progesterone (PROMETRIUM) 200 MG capsule, Take 200 mg by mouth every evening., Disp: , Rfl:     tiZANidine (ZANAFLEX) 4 MG tablet, Take 4 mg by mouth 3 (three) times daily., Disp: , Rfl:     FLUoxetine 10 MG capsule, Take 1 capsule (10 mg total) by mouth once daily., Disp: 90 capsule, Rfl: 4     Review of Systems: See HPI    Medical History:   has a past medical history of Abnormal Pap smear of cervix, Abnormal ultrasound of breast, Anxiety, Diffuse cystic mastopathy,  "Fatty liver, History of chickenpox, Hot flashes, and Insomnia.    Surgical History:   has a past surgical history that includes [place] tooth extraction; Bladder suspension; HYSTERECTOMY; Arthroscopy of knee (Left); and Shoulder surgery.    Social History:   reports that she has quit smoking. She has never used smokeless tobacco. She reports that she does not drink alcohol and does not use drugs.  Employment Status: Cuts hair    Family History:  family history includes Diabetes in her father; Hypertension in her mother; No Known Problems in her brother, maternal grandfather, maternal grandmother, paternal grandfather, paternal grandmother, and sister.    Physical Exam:  /76   Pulse 88   Ht 5' 1" (1.549 m)   SpO2 98%   BMI 27.21 kg/m²   GEN: No acute distress. Calm, comfortable  HENT: Normocephalic, atraumatic, moist mucous membranes  EYE: Anicteric sclera, non-injected.   CV: Non-diaphoretic. Regular Rate. Radial Pulses 2+.  RESP: Breathing comfortably. Chest expansion symmetric.  EXT: No clubbing, cyanosis.   SKIN: Warm, & dry to palpation. No visible rashes or lesions of exposed skin.   PSYCH: Pleasant mood and appropriate affect. Recent and remote memory intact.   GAIT: Independent, normal ambulation  Neck Exam:       Inspection: No erythema, bruising. Right shoulder higher than left.       Palpation:   - (+) TTP of bilateral paravertebral region  - (-) TTP of bilateral occiput region  - (-) TTP of midline spinous processes  - (+) TTP of bilateral trapezius, levators       ROM: (+) Limitation in all planes of motion.   - Pain with all planes of motion, worse with extension and right rotation      Provocative Maneuvers:  - (+) Spurling's on the right  - (+) facet loading bilaterally  Shoulder Exam:       Inspection: No erythema, bruising.       Palpation: (+) TTP of right bicipital groove       ROM: (-) Limited in abduction, internal rotation b/l       Provocative Maneuvers:  (+) Hawkin's bilaterally      " " (+) Empty Can bilaterally       (-) Cross arm bilaterally   (+) Speed's on right  Neurologic Exam:     Alert. Speech is fluent and appropriate.     Strength: 4/5 in right elbow flexion, left AbDM, otherwise 5/5 throughout bilateral upper & lower extremities     Sensation: Abnormal to LT in right lateral shoulder, whole right hand, otherwise grossly intact to light touch in bilateral upper & lower extremities     Reflexes:  2+ in b/l patella, achilles, biceps, brachioradialis, triceps     Tone: No abnormality appreciated in bilateral upper or lower extremities     (+) Negron bilaterally           Imaging:  - MRI Cervical Spine 10/1/21:        Labs:  BMP  No results found for: "NA", "K", "CL", "CO2", "BUN", "CREATININE", "CALCIUM", "ANIONGAP", "EGFRNORACEVR"  No results found for: "ALT", "AST", "GGT", "ALKPHOS", "BILITOT"  No results found for: "PLT"    Assessment:  Stacia Womack is a 54 y.o. female with the following diagnoses based on history, exam, and imaging:    Problem List Items Addressed This Visit    None  Visit Diagnoses       Dorsalgia    -  Primary    Relevant Orders    X-Ray Lumbar Spine AP And Lateral    Neck pain        Relevant Orders    X-Ray Cervical Spine AP And Lateral    Biceps tendinitis of right shoulder        Relevant Orders    X-ray Shoulder 2 or More Views Right    DDD (degenerative disc disease), cervical        Cervical spondylosis        Cervical radiculopathy                This is a pleasant 54 y.o. lady presenting with:     - Chronic neck pain: Bilateral paraspinal and interscapular pain radiating into the right arm in C5 distribution.    - (+) Negron b/l on exam  - Chronic right shoulder pain: Signs of biceps tendonitis on exam, but also paresthesia in C5 distribution.   - Chronic low back pain: not fully assessed today.   - Comorbidities: Anxiety. Asthma. Fatty liver. Insomnia    Treatment Plan:   - PT/OT/HEP: Cont PT. Discussed benefits of exercise for pain.   - Procedures: " Plan for right biceps tendon sheath CSI w/ US guidance   - Consider botox right trapezius and levator.    - Consider MBB b/l C4-5 and C5-6.   - Medications: No changes recommended at this time.  - Imaging: Reviewed. X-ray c-spine, l-spine. Right shoulder   - Need MRI C-spine.   - Labs: Reviewed.  Medications are appropriately dosed for current hepatorenal function.  - Request records from Dr. Scanlon, Dr. Robles ([facility]) and new C-spine MRI.       Follow Up: RTC in 2 weeks to assess back pain and can perform right biceps tendon CSI    I spent a total of 70 minutes on the day of the visit.This includes face to face time and non-face to face time preparing to see the patient (eg, review of tests), obtaining and/or reviewing separately obtained history, documenting clinical information in the electronic or other health record, independently interpreting results and communicating results to the patient/family/caregiver, or care coordinator.      Kimberly Landrum MD  Interventional Pain Medicine

## 2025-01-30 ENCOUNTER — DOCUMENTATION ONLY (OUTPATIENT)
Dept: PAIN MEDICINE | Facility: CLINIC | Age: 55
End: 2025-01-30

## 2025-01-30 ENCOUNTER — OFFICE VISIT (OUTPATIENT)
Dept: PAIN MEDICINE | Facility: CLINIC | Age: 55
End: 2025-01-30
Payer: COMMERCIAL

## 2025-01-30 VITALS
HEIGHT: 61 IN | BODY MASS INDEX: 27.38 KG/M2 | WEIGHT: 145 LBS | SYSTOLIC BLOOD PRESSURE: 107 MMHG | DIASTOLIC BLOOD PRESSURE: 71 MMHG | HEART RATE: 64 BPM

## 2025-01-30 DIAGNOSIS — M50.30 DDD (DEGENERATIVE DISC DISEASE), CERVICAL: ICD-10-CM

## 2025-01-30 DIAGNOSIS — M47.812 CERVICAL SPONDYLOSIS: ICD-10-CM

## 2025-01-30 DIAGNOSIS — M54.41 CHRONIC BILATERAL LOW BACK PAIN WITH BILATERAL SCIATICA: ICD-10-CM

## 2025-01-30 DIAGNOSIS — M70.62 TROCHANTERIC BURSITIS OF BOTH HIPS: ICD-10-CM

## 2025-01-30 DIAGNOSIS — M54.16 LUMBAR RADICULOPATHY: ICD-10-CM

## 2025-01-30 DIAGNOSIS — Q76.49 TRANSITIONAL VERTEBRA: ICD-10-CM

## 2025-01-30 DIAGNOSIS — G89.29 CHRONIC BILATERAL LOW BACK PAIN WITH BILATERAL SCIATICA: ICD-10-CM

## 2025-01-30 DIAGNOSIS — M54.12 CERVICAL RADICULOPATHY: ICD-10-CM

## 2025-01-30 DIAGNOSIS — M54.42 CHRONIC BILATERAL LOW BACK PAIN WITH BILATERAL SCIATICA: ICD-10-CM

## 2025-01-30 DIAGNOSIS — M75.21 BICEPS TENDINITIS OF RIGHT SHOULDER: ICD-10-CM

## 2025-01-30 DIAGNOSIS — M70.61 TROCHANTERIC BURSITIS OF BOTH HIPS: ICD-10-CM

## 2025-01-30 DIAGNOSIS — M54.2 NECK PAIN: Primary | ICD-10-CM

## 2025-01-30 RX ORDER — LIDOCAINE HYDROCHLORIDE 10 MG/ML
1 INJECTION, SOLUTION INFILTRATION; PERINEURAL
Status: DISCONTINUED | OUTPATIENT
Start: 2025-01-30 | End: 2025-01-30 | Stop reason: HOSPADM

## 2025-01-30 RX ORDER — METHYLPREDNISOLONE ACETATE 40 MG/ML
40 INJECTION, SUSPENSION INTRA-ARTICULAR; INTRALESIONAL; INTRAMUSCULAR; SOFT TISSUE
Status: DISCONTINUED | OUTPATIENT
Start: 2025-01-30 | End: 2025-01-30 | Stop reason: HOSPADM

## 2025-01-30 RX ADMIN — METHYLPREDNISOLONE ACETATE 40 MG: 40 INJECTION, SUSPENSION INTRA-ARTICULAR; INTRALESIONAL; INTRAMUSCULAR; SOFT TISSUE at 08:01

## 2025-01-30 RX ADMIN — LIDOCAINE HYDROCHLORIDE 1 ML: 10 INJECTION, SOLUTION INFILTRATION; PERINEURAL at 08:01
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
## 2025-01-30 NOTE — PROCEDURES
Tendon Sheath    Date/Time: 1/30/2025 8:40 AM    Performed by: Kimberly Landrum MD  Authorized by: Kimberly Landrum MD    Consent Done?:  Yes (Written)  Indications:  Pain  Site marked: the procedure site was marked    Timeout: prior to procedure the correct patient, procedure, and site was verified    Prep: patient was prepped and draped in usual sterile fashion      Local anesthesia used?: No    Location:  Shoulder  Site:  R bicep tendon  Ultrasonic guidance for needle placement?: Yes    Needle size:  25 G  Approach:  Lateral  Medications:  1 mL LIDOcaine HCL 10 mg/ml (1%) 10 mg/mL (1 %); 40 mg methylPREDNISolone acetate 40 mg/mL  Patient tolerance:  Patient tolerated the procedure well with no immediate complications

## 2025-02-04 ENCOUNTER — TELEPHONE (OUTPATIENT)
Dept: PAIN MEDICINE | Facility: CLINIC | Age: 55
End: 2025-02-04
Payer: COMMERCIAL

## 2025-02-04 NOTE — TELEPHONE ENCOUNTER
Spoke with pt, she reports worsening neck pain and would like to discuss further/ ask with Dr Landrum for his recommendation.    Will fwd to Dr Landrum to advise.

## 2025-02-05 ENCOUNTER — OFFICE VISIT (OUTPATIENT)
Dept: PAIN MEDICINE | Facility: CLINIC | Age: 55
End: 2025-02-05
Payer: COMMERCIAL

## 2025-02-05 DIAGNOSIS — G89.29 CHRONIC BILATERAL LOW BACK PAIN WITH BILATERAL SCIATICA: ICD-10-CM

## 2025-02-05 DIAGNOSIS — M75.21 BICEPS TENDINITIS OF RIGHT SHOULDER: Primary | ICD-10-CM

## 2025-02-05 DIAGNOSIS — M54.12 CERVICAL RADICULOPATHY: ICD-10-CM

## 2025-02-05 DIAGNOSIS — M54.16 LUMBAR RADICULOPATHY: ICD-10-CM

## 2025-02-05 DIAGNOSIS — M54.2 NECK PAIN: ICD-10-CM

## 2025-02-05 DIAGNOSIS — M70.61 TROCHANTERIC BURSITIS OF BOTH HIPS: ICD-10-CM

## 2025-02-05 DIAGNOSIS — M47.812 CERVICAL SPONDYLOSIS: ICD-10-CM

## 2025-02-05 DIAGNOSIS — M70.62 TROCHANTERIC BURSITIS OF BOTH HIPS: ICD-10-CM

## 2025-02-05 DIAGNOSIS — M50.30 DDD (DEGENERATIVE DISC DISEASE), CERVICAL: ICD-10-CM

## 2025-02-05 DIAGNOSIS — M54.42 CHRONIC BILATERAL LOW BACK PAIN WITH BILATERAL SCIATICA: ICD-10-CM

## 2025-02-05 DIAGNOSIS — M54.41 CHRONIC BILATERAL LOW BACK PAIN WITH BILATERAL SCIATICA: ICD-10-CM

## 2025-02-05 PROCEDURE — 1159F MED LIST DOCD IN RCRD: CPT | Mod: CPTII,95,, | Performed by: PHYSICAL MEDICINE & REHABILITATION

## 2025-02-05 PROCEDURE — 98006 SYNCH AUDIO-VIDEO EST MOD 30: CPT | Mod: 95,,, | Performed by: PHYSICAL MEDICINE & REHABILITATION

## 2025-02-05 PROCEDURE — 1160F RVW MEDS BY RX/DR IN RCRD: CPT | Mod: CPTII,95,, | Performed by: PHYSICAL MEDICINE & REHABILITATION

## 2025-02-05 RX ORDER — PREGABALIN 75 MG/1
75 CAPSULE ORAL 2 TIMES DAILY
Qty: 60 CAPSULE | Refills: 5 | Status: SHIPPED | OUTPATIENT
Start: 2025-02-05 | End: 2025-08-06

## 2025-02-05 NOTE — Clinical Note
Please have  fax over MRI report of her cervical spine from 1/3/25. She has signed a [place]WR[place] for Dr. Scanlon, but we have not received any records yet.   Could we send her a message to fill out the MICHAELA for her back pain, I do not see that in the chart.   Schedule right C4-5 transforaminal epidural steroid injection under fluoroscopic guidance with local/MAC sedation. (CPT Code 25905). The patient is not allergic to iodinated contrast which will be used for the procedure.. Patient is not currently taking blood thinners for CV prophylaxis.  F/u 2 weeks after FROILAN with me please.
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
## 2025-02-10 ENCOUNTER — PATIENT MESSAGE (OUTPATIENT)
Dept: PAIN MEDICINE | Facility: CLINIC | Age: 55
End: 2025-02-10
Payer: COMMERCIAL

## 2025-02-10 DIAGNOSIS — M54.12 CERVICAL RADICULOPATHY: Primary | ICD-10-CM

## 2025-02-11 ENCOUNTER — TELEPHONE (OUTPATIENT)
Dept: PAIN MEDICINE | Facility: CLINIC | Age: 55
End: 2025-02-11
Payer: COMMERCIAL

## 2025-02-11 NOTE — TELEPHONE ENCOUNTER
Spoke with patient, she is concerned about sedation for the procedure. Pt would like to know the exact medication and reason for stopping certain medications prior to procedure.     Will route to dr jaime to discuss further.

## 2025-02-12 ENCOUNTER — TELEPHONE (OUTPATIENT)
Dept: PAIN MEDICINE | Facility: CLINIC | Age: 55
End: 2025-02-12
Payer: COMMERCIAL

## 2025-02-12 NOTE — TELEPHONE ENCOUNTER
----- Message from Brooklynn sent at 2/11/2025  9:23 AM CST -----  Regarding: PA  Approved Auth# [id number] for dates 02/11 to 08/10/25 (02/17)

## 2025-02-17 ENCOUNTER — OUTSIDE PLACE OF SERVICE (OUTPATIENT)
Dept: PAIN MEDICINE | Facility: CLINIC | Age: 55
End: 2025-02-17

## 2025-02-18 ENCOUNTER — RESULTS FOLLOW-UP (OUTPATIENT)
Dept: PAIN MEDICINE | Facility: CLINIC | Age: 55
End: 2025-02-18

## 2025-03-05 ENCOUNTER — OFFICE VISIT (OUTPATIENT)
Dept: PAIN MEDICINE | Facility: CLINIC | Age: 55
End: 2025-03-05
Payer: COMMERCIAL

## 2025-03-05 VITALS
BODY MASS INDEX: 27.39 KG/M2 | HEART RATE: 73 BPM | WEIGHT: 145.06 LBS | SYSTOLIC BLOOD PRESSURE: 107 MMHG | HEIGHT: 61 IN | DIASTOLIC BLOOD PRESSURE: 63 MMHG

## 2025-03-05 DIAGNOSIS — M54.42 CHRONIC BILATERAL LOW BACK PAIN WITH BILATERAL SCIATICA: Primary | ICD-10-CM

## 2025-03-05 DIAGNOSIS — M54.16 LUMBAR RADICULOPATHY: ICD-10-CM

## 2025-03-05 DIAGNOSIS — Q76.49 TRANSITIONAL VERTEBRA: ICD-10-CM

## 2025-03-05 DIAGNOSIS — M54.41 CHRONIC BILATERAL LOW BACK PAIN WITH BILATERAL SCIATICA: Primary | ICD-10-CM

## 2025-03-05 DIAGNOSIS — M70.61 TROCHANTERIC BURSITIS OF BOTH HIPS: ICD-10-CM

## 2025-03-05 DIAGNOSIS — M70.62 TROCHANTERIC BURSITIS OF BOTH HIPS: ICD-10-CM

## 2025-03-05 DIAGNOSIS — M25.50 POLYARTHRALGIA: ICD-10-CM

## 2025-03-05 DIAGNOSIS — G89.29 CHRONIC BILATERAL LOW BACK PAIN WITH BILATERAL SCIATICA: Primary | ICD-10-CM

## 2025-03-05 PROCEDURE — 3078F DIAST BP <80 MM HG: CPT | Mod: CPTII,,, | Performed by: PHYSICAL MEDICINE & REHABILITATION

## 2025-03-05 PROCEDURE — 3074F SYST BP LT 130 MM HG: CPT | Mod: CPTII,,, | Performed by: PHYSICAL MEDICINE & REHABILITATION

## 2025-03-05 PROCEDURE — 99214 OFFICE O/P EST MOD 30 MIN: CPT | Mod: S$PBB,,, | Performed by: PHYSICAL MEDICINE & REHABILITATION

## 2025-03-05 PROCEDURE — 3008F BODY MASS INDEX DOCD: CPT | Mod: CPTII,,, | Performed by: PHYSICAL MEDICINE & REHABILITATION

## 2025-03-05 PROCEDURE — 1159F MED LIST DOCD IN RCRD: CPT | Mod: CPTII,,, | Performed by: PHYSICAL MEDICINE & REHABILITATION

## 2025-03-05 RX ORDER — PROGESTERONE 200 MG/1
1 CAPSULE ORAL NIGHTLY
COMMUNITY
Start: 2024-10-29

## 2025-03-05 RX ORDER — ALPRAZOLAM 0.5 MG/1
1 TABLET ORAL
COMMUNITY

## 2025-03-05 RX ORDER — TESTOSTERONE GEL, 1% 10 MG/G
GEL TRANSDERMAL
COMMUNITY

## 2025-03-05 RX ORDER — PROGESTERONE 100 MG/1
200 CAPSULE ORAL NIGHTLY
COMMUNITY
Start: 2024-10-24

## 2025-03-05 RX ORDER — GABAPENTIN 300 MG/1
1 CAPSULE ORAL NIGHTLY
COMMUNITY
Start: 2024-12-24
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
## 2025-03-06 ENCOUNTER — RESULTS FOLLOW-UP (OUTPATIENT)
Dept: PAIN MEDICINE | Facility: CLINIC | Age: 55
End: 2025-03-06

## 2025-03-06 ENCOUNTER — CLINICAL SUPPORT (OUTPATIENT)
Dept: OBSTETRICS AND GYNECOLOGY | Facility: CLINIC | Age: 55
End: 2025-03-06
Payer: COMMERCIAL

## 2025-03-06 DIAGNOSIS — Z01.89 ROUTINE LAB DRAW: Primary | ICD-10-CM

## 2025-03-06 LAB
CRP QUALITATIVE: NEGATIVE MG/L
CRP QUANTITATIVE: <3 MG/L
RHEUMATOID ARTHRITIS FACTOR: NEGATIVE IU/ML
RHEUMATOID ARTHRITIS, QN/FLUID: <10 IU/ML
SED RATE (WESTERGREN): 1 MM/HR (ref 0–30)

## 2025-03-10 ENCOUNTER — TELEPHONE (OUTPATIENT)
Dept: PAIN MEDICINE | Facility: CLINIC | Age: 55
End: 2025-03-10
Payer: COMMERCIAL

## 2025-03-10 NOTE — TELEPHONE ENCOUNTER
Faxed to [facility] @ [phone]    ----- Message from Brooklynn sent at 3/10/2025  9:17 AM CDT -----  Regarding: PA  MRI Approved @ [facility] Auth# [id number] for dates 03/10 to 09/02/25They wouldn't approve [place]

## 2025-03-11 ENCOUNTER — PATIENT MESSAGE (OUTPATIENT)
Dept: PAIN MEDICINE | Facility: CLINIC | Age: 55
End: 2025-03-11
Payer: COMMERCIAL

## 2025-03-17 ENCOUNTER — PATIENT MESSAGE (OUTPATIENT)
Dept: OBSTETRICS AND GYNECOLOGY | Facility: CLINIC | Age: 55
End: 2025-03-17

## 2025-03-17 ENCOUNTER — TELEPHONE (OUTPATIENT)
Dept: OBSTETRICS AND GYNECOLOGY | Facility: CLINIC | Age: 55
End: 2025-03-17

## 2025-03-17 ENCOUNTER — OFFICE VISIT (OUTPATIENT)
Dept: OBSTETRICS AND GYNECOLOGY | Facility: CLINIC | Age: 55
End: 2025-03-17
Payer: COMMERCIAL

## 2025-03-17 VITALS
DIASTOLIC BLOOD PRESSURE: 65 MMHG | SYSTOLIC BLOOD PRESSURE: 118 MMHG | BODY MASS INDEX: 27.78 KG/M2 | WEIGHT: 147 LBS | HEART RATE: 71 BPM

## 2025-03-17 DIAGNOSIS — Z78.0 MENOPAUSE: ICD-10-CM

## 2025-03-17 DIAGNOSIS — Z13.820 ENCOUNTER FOR SCREENING FOR OSTEOPOROSIS: ICD-10-CM

## 2025-03-17 DIAGNOSIS — Z12.31 BREAST CANCER SCREENING BY MAMMOGRAM: Primary | ICD-10-CM

## 2025-03-17 DIAGNOSIS — Z01.419 WELL WOMAN EXAM WITH ROUTINE GYNECOLOGICAL EXAM: ICD-10-CM

## 2025-03-17 DIAGNOSIS — R19.00 PELVIC FULLNESS: ICD-10-CM

## 2025-03-17 PROCEDURE — 3078F DIAST BP <80 MM HG: CPT | Mod: CPTII,,, | Performed by: OBSTETRICS & GYNECOLOGY

## 2025-03-17 PROCEDURE — 99396 PREV VISIT EST AGE 40-64: CPT | Mod: S$PBB,,, | Performed by: OBSTETRICS & GYNECOLOGY

## 2025-03-17 PROCEDURE — 3074F SYST BP LT 130 MM HG: CPT | Mod: CPTII,,, | Performed by: OBSTETRICS & GYNECOLOGY

## 2025-03-17 PROCEDURE — 1159F MED LIST DOCD IN RCRD: CPT | Mod: CPTII,,, | Performed by: OBSTETRICS & GYNECOLOGY

## 2025-03-17 PROCEDURE — 3008F BODY MASS INDEX DOCD: CPT | Mod: CPTII,,, | Performed by: OBSTETRICS & GYNECOLOGY

## 2025-03-17 NOTE — PROGRESS NOTES
Subjective:       Patient ID: Stacia Womack is a 54 y.o. female.    Chief Complaint:  Well Woman      History of Present Illness  She is doing well.  No new health issues,  No abnormal bleeding, No GI or Gu concerns,  No dyspareunia.   She is not on any med from me other than occ xanax.    She Is on her HRT with Penelope Price   testosterone cream and progesterone and estrogen    HPI      GYN & OB History  No LMP recorded. Patient has had a hysterectomy.     Date of Last Pap: 2023    OB History    Para Term  AB Living   1     1   SAB IAB Ectopic Multiple Live Births             # Outcome Date GA Lbr Ramakrishna/2nd Weight Sex Type Anes PTL Lv   1                 Review of Systems  Review of Systems   Constitutional:  Negative for activity change.   Eyes:  Negative for visual disturbance.   Respiratory:  Negative for shortness of breath.    Cardiovascular:  Negative for chest pain.   Gastrointestinal:  Negative for abdominal pain.   Genitourinary:  Negative for vaginal bleeding.        No abnormal vaginal bleeding   Musculoskeletal:  Negative for back pain.   Integumentary:  Negative for rash and breast mass.   Neurological:  Negative for numbness.   Psychiatric/Behavioral:          No mood disturbance or changes    Breast: Negative for mass            Objective:    Physical Exam:   Constitutional: She is oriented to person, place, and time. She appears well-developed.    HENT:   Head: Normocephalic.     Neck: Trachea normal. No thyromegaly present.    Cardiovascular:  Normal rate, regular rhythm and normal heart sounds.             Pulmonary/Chest: Effort normal and breath sounds normal. Right breast exhibits no mass, no nipple discharge and no skin change. Left breast exhibits no mass, no nipple discharge and no skin change.   Mild fibrocystic changes    During the exam self breast exam discussed         Abdominal: Soft. There is no abdominal tenderness. There is no rigidity and no guarding.      Genitourinary:    Vagina normal.      Pelvic exam was performed with patient supine.     Labial bartholins normal.There is no lesion on the right labia. There is no lesion on the left labia. Right adnexum displays no mass and no tenderness. Left adnexum displays no mass and no tenderness. Cervix is absent.Uterus is absent.              Lymphadenopathy:        Head (right side): No submental and no submandibular adenopathy present.        Head (left side): No submental and no submandibular adenopathy present.     She has no cervical adenopathy.    Neurological: She is alert and oriented to person, place, and time.    Skin: Skin is warm.    Psychiatric: She has a normal mood and affect. Her speech is normal and behavior is normal. Thought content normal.          Assessment:        1. Breast cancer screening by mammogram    2. Encounter for screening for osteoporosis    3. Pelvic fullness    4. Well woman exam with routine gynecological exam    5. Menopause               Plan:           Discussed  I discussed the consent up progesterone she does not notice any that would stay as the [facility] health organization is the met very minimal increased risk of breast cancer in that arm of the study albeit it was with Provera not a natural pill   we will do a pelvic ultrasound for pelvic  Pap  not done    Mammogram  Follow up one year or sooner if needed  Self breast exams  Consider health profile if labs not done with PCP  Recommend colonoscopy as indicated  Bone density discussed   Pt is aware we call all results. If she does not hear from our office regarding her result within a week of having a study or procedure performed she is to call the office so that we can research the result for her as I do not know when she is scheduling her procedures.   Chaperone was present

## 2025-03-17 NOTE — TELEPHONE ENCOUNTER
----- Message from Sheila sent at 3/17/2025 11:48 AM CDT -----  Type:  Needs Medical AdviceWho Called: Stacia ADAME FontenotSymptoms (please be specific): -How long has patient had these symptoms:  -Pharmacy name and phone #:  -Would the patient rather a call back or a response via [facility]? [facility] Call Back Number: [phone] Information: pt needs a CB she needs to schedule another US p/ Dr Decker please call

## 2025-03-19 ENCOUNTER — OFFICE VISIT (OUTPATIENT)
Dept: PAIN MEDICINE | Facility: CLINIC | Age: 55
End: 2025-03-19
Payer: COMMERCIAL

## 2025-03-19 VITALS
DIASTOLIC BLOOD PRESSURE: 55 MMHG | SYSTOLIC BLOOD PRESSURE: 108 MMHG | HEIGHT: 61 IN | WEIGHT: 147.06 LBS | HEART RATE: 72 BPM | BODY MASS INDEX: 27.77 KG/M2

## 2025-03-19 DIAGNOSIS — Q76.49 BERTOLOTTI'S SYNDROME: Primary | ICD-10-CM

## 2025-03-19 DIAGNOSIS — M46.1 BILATERAL SACROILIITIS: Primary | ICD-10-CM

## 2025-03-19 DIAGNOSIS — M53.3 SACROILIAC JOINT DYSFUNCTION: ICD-10-CM

## 2025-03-19 PROCEDURE — 99215 OFFICE O/P EST HI 40 MIN: CPT | Mod: S$PBB,,, | Performed by: PHYSICAL MEDICINE & REHABILITATION

## 2025-03-19 PROCEDURE — 1159F MED LIST DOCD IN RCRD: CPT | Mod: CPTII,,, | Performed by: PHYSICAL MEDICINE & REHABILITATION

## 2025-03-19 PROCEDURE — 3074F SYST BP LT 130 MM HG: CPT | Mod: CPTII,,, | Performed by: PHYSICAL MEDICINE & REHABILITATION

## 2025-03-19 PROCEDURE — 3078F DIAST BP <80 MM HG: CPT | Mod: CPTII,,, | Performed by: PHYSICAL MEDICINE & REHABILITATION

## 2025-03-19 PROCEDURE — 3008F BODY MASS INDEX DOCD: CPT | Mod: CPTII,,, | Performed by: PHYSICAL MEDICINE & REHABILITATION

## 2025-03-19 NOTE — Clinical Note
Could you pleas pull the images of the CT Abd in [place] from 2013? Also the most recent lumbar x-rays looked to have the right side marked with the Left marker, are we able to correct that?
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
## 2025-03-21 ENCOUNTER — TELEPHONE (OUTPATIENT)
Dept: PAIN MEDICINE | Facility: CLINIC | Age: 55
End: 2025-03-21
Payer: COMMERCIAL

## 2025-03-21 DIAGNOSIS — F41.9 ANXIETY: Primary | ICD-10-CM

## 2025-03-21 RX ORDER — ALPRAZOLAM 0.5 MG/1
0.5 TABLET ORAL EVERY 8 HOURS PRN
Qty: 28 TABLET | Refills: 0 | Status: SHIPPED | OUTPATIENT
Start: 2025-03-21 | End: 2026-03-21

## 2025-03-21 NOTE — TELEPHONE ENCOUNTER
----- Message from Brooklynn sent at 3/21/2025  8:23 AM CDT -----  Regarding: PA  Approved Auth# [id number] for dates 03/20 to 09/16/25 (03/28)

## 2025-03-25 ENCOUNTER — PATIENT MESSAGE (OUTPATIENT)
Dept: PAIN MEDICINE | Facility: CLINIC | Age: 55
End: 2025-03-25
Payer: COMMERCIAL

## 2025-03-25 NOTE — TELEPHONE ENCOUNTER
Spoke with pt, clarified that a new order for PT was not sent at last visit. Pt verbalized understanding.

## 2025-03-26 ENCOUNTER — PATIENT MESSAGE (OUTPATIENT)
Dept: PAIN MEDICINE | Facility: CLINIC | Age: 55
End: 2025-03-26
Payer: COMMERCIAL

## 2025-03-26 DIAGNOSIS — M54.42 CHRONIC BILATERAL LOW BACK PAIN WITH BILATERAL SCIATICA: ICD-10-CM

## 2025-03-26 DIAGNOSIS — G89.29 CHRONIC BILATERAL LOW BACK PAIN WITH BILATERAL SCIATICA: ICD-10-CM

## 2025-03-26 DIAGNOSIS — M50.30 DDD (DEGENERATIVE DISC DISEASE), CERVICAL: ICD-10-CM

## 2025-03-26 DIAGNOSIS — M54.2 NECK PAIN: ICD-10-CM

## 2025-03-26 DIAGNOSIS — M70.61 TROCHANTERIC BURSITIS OF BOTH HIPS: ICD-10-CM

## 2025-03-26 DIAGNOSIS — M70.62 TROCHANTERIC BURSITIS OF BOTH HIPS: ICD-10-CM

## 2025-03-26 DIAGNOSIS — M47.812 CERVICAL SPONDYLOSIS: ICD-10-CM

## 2025-03-26 DIAGNOSIS — M54.41 CHRONIC BILATERAL LOW BACK PAIN WITH BILATERAL SCIATICA: ICD-10-CM

## 2025-03-26 DIAGNOSIS — M75.21 BICEPS TENDINITIS OF RIGHT SHOULDER: ICD-10-CM

## 2025-03-26 DIAGNOSIS — M54.16 LUMBAR RADICULOPATHY: ICD-10-CM

## 2025-03-26 DIAGNOSIS — M54.12 CERVICAL RADICULOPATHY: ICD-10-CM

## 2025-03-27 ENCOUNTER — PROCEDURE VISIT (OUTPATIENT)
Dept: OBSTETRICS AND GYNECOLOGY | Facility: CLINIC | Age: 55
End: 2025-03-27
Payer: COMMERCIAL

## 2025-03-27 DIAGNOSIS — R19.00 PELVIC FULLNESS: ICD-10-CM

## 2025-03-27 PROCEDURE — 76830 TRANSVAGINAL US NON-OB: CPT | Mod: 26,S$PBB,, | Performed by: OBSTETRICS & GYNECOLOGY

## 2025-03-28 ENCOUNTER — OUTSIDE PLACE OF SERVICE (OUTPATIENT)
Dept: PAIN MEDICINE | Facility: CLINIC | Age: 55
End: 2025-03-28
Payer: COMMERCIAL

## 2025-04-01 ENCOUNTER — RESULTS FOLLOW-UP (OUTPATIENT)
Dept: OBSTETRICS AND GYNECOLOGY | Facility: CLINIC | Age: 55
End: 2025-04-01

## 2025-04-02 DIAGNOSIS — N83.209 CYST OF OVARY, UNSPECIFIED LATERALITY: Primary | ICD-10-CM

## 2025-04-09 RX ORDER — PREGABALIN 75 MG/1
75 CAPSULE ORAL 2 TIMES DAILY
Qty: 60 CAPSULE | Refills: 5 | Status: SHIPPED | OUTPATIENT
Start: 2025-04-09 | End: 2025-10-08

## 2025-04-16 ENCOUNTER — OFFICE VISIT (OUTPATIENT)
Dept: PAIN MEDICINE | Facility: CLINIC | Age: 55
End: 2025-04-16
Payer: COMMERCIAL

## 2025-04-16 VITALS
SYSTOLIC BLOOD PRESSURE: 97 MMHG | WEIGHT: 151.5 LBS | BODY MASS INDEX: 28.6 KG/M2 | HEART RATE: 71 BPM | OXYGEN SATURATION: 98 % | HEIGHT: 61 IN | DIASTOLIC BLOOD PRESSURE: 54 MMHG

## 2025-04-16 DIAGNOSIS — G89.29 CHRONIC BILATERAL LOW BACK PAIN WITH BILATERAL SCIATICA: Primary | ICD-10-CM

## 2025-04-16 DIAGNOSIS — M54.41 CHRONIC BILATERAL LOW BACK PAIN WITH BILATERAL SCIATICA: Primary | ICD-10-CM

## 2025-04-16 DIAGNOSIS — M46.1 BILATERAL SACROILIITIS: ICD-10-CM

## 2025-04-16 DIAGNOSIS — M53.3 SACROILIAC JOINT DYSFUNCTION: ICD-10-CM

## 2025-04-16 DIAGNOSIS — M54.42 CHRONIC BILATERAL LOW BACK PAIN WITH BILATERAL SCIATICA: Primary | ICD-10-CM

## 2025-04-16 DIAGNOSIS — M47.816 LUMBAR SPONDYLOSIS: ICD-10-CM

## 2025-04-16 DIAGNOSIS — Q76.49 BERTOLOTTI'S SYNDROME: ICD-10-CM

## 2025-04-16 DIAGNOSIS — M54.16 LUMBAR RADICULOPATHY: ICD-10-CM

## 2025-04-16 DIAGNOSIS — M54.12 CERVICAL RADICULOPATHY: ICD-10-CM

## 2025-04-16 PROCEDURE — 3074F SYST BP LT 130 MM HG: CPT | Mod: CPTII,,, | Performed by: PHYSICIAN ASSISTANT

## 2025-04-16 PROCEDURE — 3078F DIAST BP <80 MM HG: CPT | Mod: CPTII,,, | Performed by: PHYSICIAN ASSISTANT

## 2025-04-16 PROCEDURE — 99215 OFFICE O/P EST HI 40 MIN: CPT | Mod: S$PBB,,, | Performed by: PHYSICIAN ASSISTANT

## 2025-04-16 PROCEDURE — 3008F BODY MASS INDEX DOCD: CPT | Mod: CPTII,,, | Performed by: PHYSICIAN ASSISTANT

## 2025-04-16 RX ORDER — BACLOFEN 10 MG/1
10-20 TABLET ORAL NIGHTLY PRN
Qty: 60 TABLET | Refills: 2 | Status: SHIPPED | OUTPATIENT
Start: 2025-04-16 | End: 2025-07-15
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
## 2025-05-13 ENCOUNTER — TELEPHONE (OUTPATIENT)
Dept: PAIN MEDICINE | Facility: CLINIC | Age: 55
End: 2025-05-13

## 2025-05-13 ENCOUNTER — OFFICE VISIT (OUTPATIENT)
Dept: PAIN MEDICINE | Facility: CLINIC | Age: 55
End: 2025-05-13
Payer: COMMERCIAL

## 2025-05-13 DIAGNOSIS — Q76.49 TRANSITIONAL VERTEBRA: ICD-10-CM

## 2025-05-13 DIAGNOSIS — Q76.49 BERTOLOTTI'S SYNDROME: ICD-10-CM

## 2025-05-13 DIAGNOSIS — M47.816 LUMBAR SPONDYLOSIS: Primary | ICD-10-CM

## 2025-05-13 DIAGNOSIS — M47.812 CERVICAL SPONDYLOSIS: ICD-10-CM

## 2025-05-13 DIAGNOSIS — M50.30 DDD (DEGENERATIVE DISC DISEASE), CERVICAL: ICD-10-CM

## 2025-05-13 PROCEDURE — 1160F RVW MEDS BY RX/DR IN RCRD: CPT | Mod: CPTII,95,, | Performed by: PHYSICAL MEDICINE & REHABILITATION

## 2025-05-13 PROCEDURE — 98006 SYNCH AUDIO-VIDEO EST MOD 30: CPT | Mod: 95,,, | Performed by: PHYSICAL MEDICINE & REHABILITATION

## 2025-05-13 PROCEDURE — 1159F MED LIST DOCD IN RCRD: CPT | Mod: CPTII,95,, | Performed by: PHYSICAL MEDICINE & REHABILITATION
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
## 2025-05-13 NOTE — TELEPHONE ENCOUNTER
Per patient request. Patient is scheduled for MBB on May 30th, 2025 & F/U appt with Ms Najera on June 2 nd, 2025.  Patient verbalized understanding.    ----- Message from Kimberly Landrum MD sent at 5/13/2025  8:42 AM CDT -----  Please schedule left L4-5, L5-S1 MBB w/ loc and f/u ASAP after.

## 2025-05-15 ENCOUNTER — TELEPHONE (OUTPATIENT)
Dept: PAIN MEDICINE | Facility: CLINIC | Age: 55
End: 2025-05-15
Payer: COMMERCIAL

## 2025-05-15 NOTE — TELEPHONE ENCOUNTER
Faxed to [facility] scheduling.    ----- Message from Brooklynn sent at 5/15/2025  8:57 AM CDT -----  Regarding: PA  Approved Auth# [id number] for dates 05/14 to 11/10/25 (05/30)   Results conveyed to patient at this time

## 2025-05-29 ENCOUNTER — PATIENT MESSAGE (OUTPATIENT)
Dept: PAIN MEDICINE | Facility: CLINIC | Age: 55
End: 2025-05-29
Payer: COMMERCIAL

## 2025-05-30 ENCOUNTER — OUTSIDE PLACE OF SERVICE (OUTPATIENT)
Dept: PAIN MEDICINE | Facility: CLINIC | Age: 55
End: 2025-05-30

## 2025-06-02 ENCOUNTER — OFFICE VISIT (OUTPATIENT)
Dept: PAIN MEDICINE | Facility: CLINIC | Age: 55
End: 2025-06-02
Payer: COMMERCIAL

## 2025-06-02 VITALS
BODY MASS INDEX: 28.7 KG/M2 | WEIGHT: 152 LBS | RESPIRATION RATE: 16 BRPM | OXYGEN SATURATION: 99 % | SYSTOLIC BLOOD PRESSURE: 108 MMHG | DIASTOLIC BLOOD PRESSURE: 65 MMHG | HEIGHT: 61 IN | HEART RATE: 80 BPM

## 2025-06-02 DIAGNOSIS — Q76.49 TRANSITIONAL VERTEBRA: ICD-10-CM

## 2025-06-02 DIAGNOSIS — M47.816 LUMBAR SPONDYLOSIS: Primary | ICD-10-CM

## 2025-06-02 DIAGNOSIS — Q76.49 BERTOLOTTI'S SYNDROME: ICD-10-CM

## 2025-06-02 PROCEDURE — 3074F SYST BP LT 130 MM HG: CPT | Mod: CPTII,,, | Performed by: PHYSICIAN ASSISTANT

## 2025-06-02 PROCEDURE — 3078F DIAST BP <80 MM HG: CPT | Mod: CPTII,,, | Performed by: PHYSICIAN ASSISTANT

## 2025-06-02 PROCEDURE — 3008F BODY MASS INDEX DOCD: CPT | Mod: CPTII,,, | Performed by: PHYSICIAN ASSISTANT

## 2025-06-02 PROCEDURE — 99214 OFFICE O/P EST MOD 30 MIN: CPT | Mod: S$PBB,,, | Performed by: PHYSICIAN ASSISTANT

## 2025-06-02 PROCEDURE — 1159F MED LIST DOCD IN RCRD: CPT | Mod: CPTII,,, | Performed by: PHYSICIAN ASSISTANT

## 2025-06-03 ENCOUNTER — TELEPHONE (OUTPATIENT)
Dept: PAIN MEDICINE | Facility: CLINIC | Age: 55
End: 2025-06-03
Payer: COMMERCIAL

## 2025-06-13 ENCOUNTER — OUTSIDE PLACE OF SERVICE (OUTPATIENT)
Dept: PAIN MEDICINE | Facility: CLINIC | Age: 55
End: 2025-06-13

## 2025-06-16 ENCOUNTER — OFFICE VISIT (OUTPATIENT)
Dept: PAIN MEDICINE | Facility: CLINIC | Age: 55
End: 2025-06-16
Payer: COMMERCIAL

## 2025-06-16 ENCOUNTER — TELEPHONE (OUTPATIENT)
Dept: PAIN MEDICINE | Facility: CLINIC | Age: 55
End: 2025-06-16

## 2025-06-16 DIAGNOSIS — G89.29 CHRONIC BILATERAL LOW BACK PAIN WITH BILATERAL SCIATICA: ICD-10-CM

## 2025-06-16 DIAGNOSIS — Q76.49 BERTOLOTTI'S SYNDROME: ICD-10-CM

## 2025-06-16 DIAGNOSIS — M47.816 LUMBAR SPONDYLOSIS: Primary | ICD-10-CM

## 2025-06-16 DIAGNOSIS — M54.41 CHRONIC BILATERAL LOW BACK PAIN WITH BILATERAL SCIATICA: ICD-10-CM

## 2025-06-16 DIAGNOSIS — M54.42 CHRONIC BILATERAL LOW BACK PAIN WITH BILATERAL SCIATICA: ICD-10-CM

## 2025-06-16 DIAGNOSIS — Q76.49 TRANSITIONAL VERTEBRA: ICD-10-CM

## 2025-06-16 PROCEDURE — 98005 SYNCH AUDIO-VIDEO EST LOW 20: CPT | Mod: 95,,, | Performed by: PHYSICIAN ASSISTANT
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
## 2025-06-16 NOTE — PATIENT INSTRUCTIONS
Pre-Procedural Instructions  Interventional Pain  Radiofrequency Ablation (RFA)    Purpose:  We are performing thermal radiofrequency ablation of particular nerves (neurotomy) in order to relieve pain from the structure innervated by that nerve.   This does not change or treat the structure that is causing the pain, but it is performed to provide relief of the pain by disrupting the signal carried from the structure.  It is important to continue therapeutic exercise with physical therapy or a home exercise program as part of your treatment to maintain range of motion and strength of the joint.   Informed Consent:  These procedures are safe, but like any interventional procedure, it does carry rare risks which include:  Infection   Bleeding  Allergic Reactions  Vasovagal reactions  No relief or worsening pain  If procedure being performed on the spine (cervical, thoracic or lumbar), potential complications include:  Spinal Cord or nerve injury which may result in weakness, numbness, changes bowel or bladder function  Ataxia/sense of imbalance/dizziness can occur when performing neurotomy of the cervical (neck) medial branches, and particularly the third occipital nerve performed bilaterally (both sides at the same time).   Most patients tolerate bilateral neurotomy of the third occipital nerve without issue, but this is undertaken cautiously. Dr. Landrum typically recommends performing these procedures on one side at a time. However, some insurance companies do not allow for that flexibility.  The sense of unsteadiness is not typically disabling. It is readily overcome if you rely on vision to locate horizontal objects in the near to far distance. It can be worsened when looking to the side or downwards.   Other potential complications:  Skin burns: These have not been reported with proper grounding pad placement and equipment.  However, if you experience any burning sensation outside of the treatment area or other  particular abnormal sensations during the procedure, please always inform the procedure team.  Pain/Discomfort:  Neuritis, numbness in the area of one of the coagulated nerves or uncomfortable sensations (19%) in the area of the coagulated nerves  These are not typically long-lasting effects. Most improve within 2 weeks, but may take up to 6-8 weeks.   Procedural discomfort  This typically improves with ice to the area in 20 minute intervals in the first 1-2 days after the procedure.     Preparing for the procedure:    Tell your healthcare provider about all medicines you take. This includes over-the-counter medicines, herbs, vitamins, and other supplements.  Tell your healthcare provider about any other medical or dental procedures planned in proximity to your procedure.   Reduce Infection Risk:   Shower or bathe the night before and morning of your scheduled procedure.  Notify clinic if you are:  Having signs of illness, such as fevers, or signs of a urinary tract infection (UTI)  Prescribed antibiotics for an infection  Reduce bleeding risk:  For 7 days prior to procedure and during the duration of the trial (until your clinic follow-up):  Stop NSAIDs (ibuprofen/Advil, naproxen/Aleve, Meloxicam/Mobic, Goody's, or others)  Stop Adriana [place], Pepto Bismol, & Herbal Medication/Supplements (such as Ginko, high dose Vitamin E, Fish Oil, Turmeric, Garlic, and others)  Home Support:  You MUST have a responsible  to bring you home from the facility and assist you at home on the day of the procedure   Plan to not be at work on the day of the procedure.   Medications:  Blood thinners: Such as: Aspirin, Plavix, Warfarin (Coumadin), Eliquis, Pradaxa, Xarelto, & others  If you are taking blood thinning medications, the clinic will notify you if you need to hold and of the number days to hold the medication prior to the procedure and when to restart these after the procedure. This will be communicated with and approved  by your prescribing physician.   Please notify the office if you are taking blood thinning medications and are unsure if or when you need to hold the medication.   GLP-1 agonists: Semaglutide (Ozempic, Wegovy, Rybelsus), Tirzepatide (Mounjaro, Zepbound), Dulaglutide (Trulicity), Liraglutide, Lixisenatide, Exenatide  These medications can increase the risk of regurgitation and pulmonary aspiration of gastric contents during general anesthesia and deep sedation  If you take this weekly, please hold for 1 week prior to the procedure  If you take this daily, please hold on the day of procedure  If these are utilized for blood sugar control, you will need to discuss with your managing provider on what to utilize for bridging the antidiabetic therapy to maintain blood sugar control and avoiding hyperglycemia  Please take other regular medications as scheduled.  Diet:  You will be receiving sedation for the procedure.  Do not eat anything for 8 hours prior to your procedure.   You may drink clear liquids up to 4 hours prior to your procedure.   Clear liquids include: water, black coffee, fruit juice without pulp, clear tea  You may drink water up to 2 hours prior to your procedure.  You may use a sip of water to take your regular medications  For Diabetic Patients:  Please discuss with your managing physician the best way to take your medications on the procedure day to minimize large increases or decreases in your blood sugar  Please notify clinic of your most recent A1c and when that was performed. Optimizing your blood sugar control prior to the procedure will help decrease your risk of complications, such as infection.   Notify clinic and we will attempt to schedule your procedure as early in the morning as possible to minimize hypoglycemia that may occur while fasting for the procedure.  Please inform clinic if:   You are pregnant or attempting to become pregnant  You have an implanted electronic device (pacemaker,  defibrillator, medication pump, stimulator, etc.)  The electrical current utilized to coagulate the nerves can damage, disrupt or potentially cause a discharge of the electronic device.  You are having signs of infection noted above or are started on antibiotics.  You are allergic to local anesthetics, or steroids.  You are having other medical procedures around the time of your procedure (within 2 weeks)    Instructions for procedure day:    We ask that you please leave your valuables at home  Avoid moisturizers or scented personal products  Wear loose fitting clothing that you can easily change in & out of  Plan to not be at work on the day of the procedure.   Please remove jewelry.  If you are having a procedure done on your head or neck, please remove any metallic items or hardware, such as dental hardware, jewelry that may obscure the images of the area during the procedure.     After the procedure: You will be sent to a recovery room after the procedure. You will go home the same day.     Home Care Instructions after the procedure:    Injection site(s)  The injection sites may be covered with a dressing.  You may remove bandage at discharge from the hospital.   Bruising may be present  Avoid soaking or bathing in hot tub, bath or pool on the day of your procedure.   Showering is okay the day of procedure. Soaking is okay the day after your procedure.   Avoid heat to the area  Notify the clinic if you are experiencing increased bleeding or drainage from the injection site(s)  Pain  Mild-moderate pain/discomfort may occur at the injection sites  Pain may be relieved with:  Ice  Tylenol (Acetaminophen)  Injection site pain typically improves after a day  Activity/Diet:  For 24 hours after the procedure:  Do NOT:  Drive or operate heavy machinery  Perform strenuous activity, heavy lifting, bending or twisting.   Perform activity that requires skill, dexterity or coordination  Drink alcohol  Make any important  decisions  Day after the procedure:   Resume daily life activities as tolerated: Let pain be your guide. Progress slowly and try not to over exert yourself if you are feeling good.   If possible, avoid activities that exacerbate your pain  Bed rest is NOT recommended   Physical Therapy (PT): You may restart your home exercise program the day following your procedure.  Diet: You may resume your normal diet as tolerated after the procedure  If nauseated, hold solid foods until nausea has resolved  Medications:  If you held any blood thinner medications for the procedure, you should have been given a specific timeline on when to restart the medication that has been determined in collaboration with your prescriber and our clinic. If you do not know when to restart, please notify clinic.  You may continue all other regular medications as prescribed.     When to call your healthcare provider [phone]  Call your healthcare provider if you have any of these symptoms:  Fever of 100.4°F (38.0°C) or higher. If you feel hot, take your temperature before notifying clinic.  New pain, weakness, tingling, or numbness in your legs. This may be expected in the first several hours after the procedure due to the local anesthetic used during the procedure.   New or worsening back pain   Redness, drainage or bleeding from site  Changes in bowel (incontinence) function  Changes in bladder (incontinence or retention) function  Persistent nausea or vomiting with inability to tolerate clear liquids for more than 12 hours       When to seek medical attention  Call [phone] right away if you have any of the following:  Chest pain  Shortness of breath  Signs of a stroke: Sudden changes in vision, changes in speech, sudden weakness in your face/arms/legs  Any other medical emergency     Follow-up: Post-procedure clinic appointment is typically 3-4 weeks after procedure. Please contact clinic if you are unsure of your follow-up.       Dr. Harris  RUDY Landrum.  [facility]  Phone: [phone]

## 2025-06-16 NOTE — Clinical Note
Please call to schedule radiofrequency ablation of the left L4/5 & L5/S1 facet joints under fluoroscopic guidance with local/MAC sedation. (CPT Codes 98312 & 09054). Patient is not currently taking blood thinners for cardiovascular prophylaxis. Hold Semaglutide 7 days prior

## 2025-06-16 NOTE — TELEPHONE ENCOUNTER
----- Message from Esperanza Canchola PA-C sent at 6/16/2025  2:57 PM CDT -----  Please call to schedule radiofrequency ablation of the left L4/5 & L5/S1 facet joints under fluoroscopic guidance with local/MAC sedation. (CPT Codes 75632 & 69184). Patient is not currently taking blood thinners for cardiovascular prophylaxis. Hold Semaglutide 7 days prior

## 2025-06-17 ENCOUNTER — TELEPHONE (OUTPATIENT)
Dept: PAIN MEDICINE | Facility: CLINIC | Age: 55
End: 2025-06-17
Payer: COMMERCIAL

## 2025-06-17 NOTE — TELEPHONE ENCOUNTER
Auth noted and faxed to day surgery team      ----- Message from Brooklynn sent at 6/17/2025  7:46 AM CDT -----  Regarding: PA  Approved Auth# [id number] for dates 06/30 to 12/27/2025

## 2025-06-30 ENCOUNTER — OUTSIDE PLACE OF SERVICE (OUTPATIENT)
Dept: PAIN MEDICINE | Facility: CLINIC | Age: 55
End: 2025-06-30

## 2025-07-14 ENCOUNTER — PROCEDURE VISIT (OUTPATIENT)
Dept: OBSTETRICS AND GYNECOLOGY | Facility: CLINIC | Age: 55
End: 2025-07-14
Payer: COMMERCIAL

## 2025-07-14 DIAGNOSIS — N83.209 CYST OF OVARY, UNSPECIFIED LATERALITY: ICD-10-CM

## 2025-07-14 PROCEDURE — 76856 US EXAM PELVIC COMPLETE: CPT | Mod: 26,S$PBB,, | Performed by: OBSTETRICS & GYNECOLOGY

## 2025-07-24 ENCOUNTER — OFFICE VISIT (OUTPATIENT)
Dept: PAIN MEDICINE | Facility: CLINIC | Age: 55
End: 2025-07-24
Payer: COMMERCIAL

## 2025-07-24 DIAGNOSIS — Q76.49 TRANSITIONAL VERTEBRA: Primary | ICD-10-CM

## 2025-07-24 DIAGNOSIS — M47.812 CERVICAL SPONDYLOSIS: ICD-10-CM

## 2025-07-24 DIAGNOSIS — M47.816 LUMBAR SPONDYLOSIS: ICD-10-CM

## 2025-07-24 PROCEDURE — 1159F MED LIST DOCD IN RCRD: CPT | Mod: CPTII,95,, | Performed by: PHYSICAL MEDICINE & REHABILITATION

## 2025-07-24 PROCEDURE — 98006 SYNCH AUDIO-VIDEO EST MOD 30: CPT | Mod: 95,,, | Performed by: PHYSICAL MEDICINE & REHABILITATION

## 2025-07-24 PROCEDURE — 1160F RVW MEDS BY RX/DR IN RCRD: CPT | Mod: CPTII,95,, | Performed by: PHYSICAL MEDICINE & REHABILITATION
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
## 2025-07-24 NOTE — Clinical Note
Schedule diagnostic medial branch block of the bilateral C3-4 & C5-6 facet joints under fluoroscopic guidance with local sedation. (CPT Codes 25847, 56532, KX modifier & 50 modifer). If MBB successful x 2, plan for RFA. The patient is not allergic to iodinated contrast. Patient is not currently taking blood thinners for cardiovascular prophylaxis  Please schedule f/u ASAP as VV w/ me or CJ.

## 2025-07-25 ENCOUNTER — TELEPHONE (OUTPATIENT)
Dept: PAIN MEDICINE | Facility: CLINIC | Age: 55
End: 2025-07-25
Payer: COMMERCIAL

## 2025-07-25 DIAGNOSIS — M47.812 CERVICAL SPONDYLOSIS: Primary | ICD-10-CM

## 2025-07-25 NOTE — TELEPHONE ENCOUNTER
----- Message from Kimberly Landrum MD sent at 7/24/2025  1:27 PM CDT -----  Schedule diagnostic medial branch block of the bilateral C3-4 & C5-6 facet joints under fluoroscopic guidance with local sedation. (CPT Codes 82771, 17711, KX modifier & 50 modifer). If MBB successful x 2, plan for RFA. The patient is not allergic to iodinated contrast. Patient is not currently taking blood thinners for cardiovascular prophylaxis    Please schedule f/u ASAP as VV w/ me or CJ.

## 2025-07-25 NOTE — TELEPHONE ENCOUNTER
Patient is scheduled for her MBB procedure on 8/11/2025 with a F/U appt with ANDREA Najera on 8/12/25. I went over the Pre-admit instructions with patient. Patient verbalized understanding.

## 2025-07-28 ENCOUNTER — TELEPHONE (OUTPATIENT)
Dept: PAIN MEDICINE | Facility: CLINIC | Age: 55
End: 2025-07-28
Payer: COMMERCIAL

## 2025-07-28 NOTE — TELEPHONE ENCOUNTER
----- Message from Brooklynn sent at 7/25/2025  2:10 PM CDT -----  Regarding: PA  Approved Auth# [id number] for dates 08/11 to 02/07/2025

## 2025-08-07 ENCOUNTER — PATIENT MESSAGE (OUTPATIENT)
Dept: PAIN MEDICINE | Facility: CLINIC | Age: 55
End: 2025-08-07
Payer: COMMERCIAL

## 2025-08-11 ENCOUNTER — OUTSIDE PLACE OF SERVICE (OUTPATIENT)
Dept: PAIN MEDICINE | Facility: CLINIC | Age: 55
End: 2025-08-11

## 2025-08-12 ENCOUNTER — OFFICE VISIT (OUTPATIENT)
Dept: PAIN MEDICINE | Facility: CLINIC | Age: 55
End: 2025-08-12
Payer: COMMERCIAL

## 2025-08-12 VITALS
OXYGEN SATURATION: 98 % | DIASTOLIC BLOOD PRESSURE: 69 MMHG | WEIGHT: 153 LBS | BODY MASS INDEX: 28.89 KG/M2 | HEART RATE: 69 BPM | SYSTOLIC BLOOD PRESSURE: 101 MMHG | HEIGHT: 61 IN

## 2025-08-12 DIAGNOSIS — M47.812 CERVICAL SPONDYLOSIS: Primary | ICD-10-CM

## 2025-08-12 DIAGNOSIS — Q76.49 TRANSITIONAL VERTEBRA: ICD-10-CM

## 2025-08-12 DIAGNOSIS — M47.816 LUMBAR SPONDYLOSIS: ICD-10-CM

## 2025-08-12 DIAGNOSIS — M53.3 SACROILIAC JOINT DYSFUNCTION: ICD-10-CM

## 2025-08-12 PROCEDURE — 3078F DIAST BP <80 MM HG: CPT | Mod: CPTII,,, | Performed by: PHYSICIAN ASSISTANT

## 2025-08-12 PROCEDURE — 3074F SYST BP LT 130 MM HG: CPT | Mod: CPTII,,, | Performed by: PHYSICIAN ASSISTANT

## 2025-08-12 PROCEDURE — 99214 OFFICE O/P EST MOD 30 MIN: CPT | Mod: S$PBB,,, | Performed by: PHYSICIAN ASSISTANT

## 2025-08-12 PROCEDURE — 3008F BODY MASS INDEX DOCD: CPT | Mod: CPTII,,, | Performed by: PHYSICIAN ASSISTANT

## 2025-08-14 ENCOUNTER — TELEPHONE (OUTPATIENT)
Dept: PAIN MEDICINE | Facility: CLINIC | Age: 55
End: 2025-08-14
Payer: COMMERCIAL

## 2025-08-25 ENCOUNTER — OUTSIDE PLACE OF SERVICE (OUTPATIENT)
Dept: PAIN MEDICINE | Facility: CLINIC | Age: 55
End: 2025-08-25

## 2025-08-26 ENCOUNTER — OFFICE VISIT (OUTPATIENT)
Dept: PAIN MEDICINE | Facility: CLINIC | Age: 55
End: 2025-08-26
Payer: COMMERCIAL

## 2025-08-26 DIAGNOSIS — M47.812 CERVICAL SPONDYLOSIS: Primary | ICD-10-CM

## 2025-08-26 PROCEDURE — 98005 SYNCH AUDIO-VIDEO EST LOW 20: CPT | Mod: 95,,, | Performed by: PHYSICIAN ASSISTANT
